# Patient Record
Sex: MALE | Race: WHITE
[De-identification: names, ages, dates, MRNs, and addresses within clinical notes are randomized per-mention and may not be internally consistent; named-entity substitution may affect disease eponyms.]

---

## 2018-10-11 ENCOUNTER — HOSPITAL ENCOUNTER (EMERGENCY)
Dept: HOSPITAL 74 - FER | Age: 83
Discharge: HOME | End: 2018-10-11
Payer: COMMERCIAL

## 2018-10-11 VITALS — TEMPERATURE: 99.7 F | HEART RATE: 82 BPM | SYSTOLIC BLOOD PRESSURE: 132 MMHG | DIASTOLIC BLOOD PRESSURE: 84 MMHG

## 2018-10-11 VITALS — BODY MASS INDEX: 29 KG/M2

## 2018-10-11 DIAGNOSIS — I10: ICD-10-CM

## 2018-10-11 DIAGNOSIS — J02.9: Primary | ICD-10-CM

## 2018-10-11 DIAGNOSIS — E11.9: ICD-10-CM

## 2018-10-11 DIAGNOSIS — R06.02: ICD-10-CM

## 2018-10-11 PROCEDURE — 3E0F7GC INTRODUCTION OF OTHER THERAPEUTIC SUBSTANCE INTO RESPIRATORY TRACT, VIA NATURAL OR ARTIFICIAL OPENING: ICD-10-PCS

## 2018-10-11 NOTE — PDOC
History of Present Illness





- General


History Source: Patient


Exam Limitations: No Limitations





- History of Present Illness


Initial Comments: 





10/11/18 09:06


83 year old man with history of HTN and DM who presents with 2 days of sore 

throat and shortness breath. The patient states two days ago while he was at 

the [facility] he was sitting next to two people who were coughing and since 

then he has felt a sore throat. The patient reports that he previously smoked 

but quit in 1999 and he was not diagnosed with COPD. The patient denies chest 

pain, fever, cough, runny nose, congestion, nausea, vomiting, diaphoresis, 

dysuria, diarrhea or constipation. At bedside the patient has no other 

complaints. 





PMHX: as in HPI


Meds: see below


Allergies: fluorouracil


Tob: quit 1999


Etoh: none


Rec drugs: none





PCP: Zain














<Antoinette Aguiar - Last Filed: 10/11/18 10:11>





<Cuba Garrido - Last Filed: 10/11/18 11:10>





- General


Chief Complaint: Sore Throat


Stated Complaint: COLD SX, SORE THROAT


Time Seen by Provider: 10/11/18 09:06





Attending Attestation





- Resident


Resident Name: Antoinette Aguiar





- ED Attending Attestation


I have performed the following: I have examined & evaluated the patient, The 

case was reviewed & discussed with the resident, I agree w/resident's findings 

& plan, Exceptions are as noted





- HPI


HPI: 


Exposed to coughing people at a public concert


Started uri symptoms , cough


10/11/18 11:01








- Physicial Exam


PE: 


Alert, oriented x 3 minimal wheezing 


10/11/18 11:03








- Medical Decision Making


URI, COPD, negative  X ray,


Improved after therapy here


10/11/18 11:04








<Cuba Garrido S - Last Filed: 10/11/18 11:10>





Past History





- Past Medical History


Anemia: No


Asthma: No


Cancer: No


Cardiac Disorders: No


CVA: No


COPD: No


CHF: No


Dementia: No


Diabetes: No


GI Disorders: Yes (REFLUX)


 Disorders: No


HTN: Yes (DX 2006)


Hypercholesterolemia: Yes (DX 2006)


Liver Disease: No


Seizures: No


Thyroid Disease: Yes (HYPOTHYROIDISM)





- Surgical History


Appendectomy: No


Cardiac Surgery: No


Cholecystectomy: No


Lung Surgery: No


Neurologic Surgery: No


Orthopedic Surgery: No





- Suicide/Smoking/Psychosocial Hx


Smoking History: Former smoker


Have you smoked in the past 12 months: No


If you are a former smoker, when did you quit?: 1982


Hx Alcohol Use: No


Drug/Substance Use Hx: No


Substance Use Type: None


Hx Substance Use Treatment: No





<Antoinette Aguiar - Last Filed: 10/11/18 10:11>





<Cuba Garrido - Last Filed: 10/11/18 11:10>





- Past Medical History


Allergies/Adverse Reactions: 


 Allergies











Allergy/AdvReac Type Severity Reaction Status Date / Time


 


fluorouracil [From [place]] Allergy  Hives Verified 10/11/18 09:07











Home Medications: 


Ambulatory Orders





Amlodipine Besylate [Norvasc -] 2.5 mg PO HS 06/08/16 


Ascorbate Calcium [Vitamin C] 1,000 mg PO DAILY 06/08/16 


Aspirin Coated [Ecotrin -] 81 mg PO DAILY 06/08/16 


Atenolol [Tenormin -] 50 mg PO HS 06/08/16 


Atorvastatin Ca [Lipitor] 10 mg PO DAILY 06/08/16 


Calcium Carbonate/Vitamin D3 [Calcium 600+D Softgel] 1 tab PO DAILY 06/08/16 


Cholecalciferol (Vitamin D3) [Vitamin D3] 1,000 unit PO DAILY 06/08/16 


Glucosamine/Chondr Pitt A Sod [Osteo Bi-Flex Caplet] 1 tab PO DAILY 06/08/16 


Levothyroxine [Synthroid -] 25 mcg PO DAILY 06/08/16 


Multivitamins [Tab-A-Vit -] 1 tab PO DAILY 06/08/16 


[place]-3S/Dha/Epa/Fish Oil [Fish Oil 1,200 mg Softgel] 1 cap PO DAILY 06/08/16 


Albuterol Sulfate Inhaler - [Ventolin HFA Inhaler -] 1 - 2 inh PO Q4H PRN #1 

inhaler 10/11/18 


Atenolol [Tenormin -] 50 mg PO DAILY 10/11/18 


Finasteride 5 mg PO DAILY 10/11/18 











**Review of Systems





- Review of Systems


Able to Perform ROS?: Yes


Is the patient limited English proficient: No


Constitutional: No: Chills, Diaphoresis, Fever


HEENTM: No: Tearing, Tinnitus


Respiratory: Yes: Shortness of Breath.  No: Cough, Orthopnea


Cardiac (ROS): No: Chest Pain


ABD/GI: No: Constipated, Diarrhea, Nausea, Vomiting


: No: Dysuria


Neurological: No: Headache, Numbness, Tingling





<Antoinette Aguiar - Last Filed: 10/11/18 10:11>





*Physical Exam





- Physical Exam


Comments: 





10/11/18 09:45


GENERAL: Awake, alert, and fully oriented, in no acute distress


HEAD: No signs of trauma, normocephalic, atraumatic 


EYES: EOMI, sclera anicteric, conjunctiva clear


ENT: oropharynx clear without exudates. Moist mucosa, slight erythema, no edema


NECK: Normal ROM, supple, no lymphadenopathy


LUNGS: No distress, speaks full sentences, + expiratory wheeze in bilateral 

upper and middle lobes 


HEART: Regular rate and rhythm, normal S1 and S2, no murmurs, rubs or gallops, 

peripheral pulses normal and equal bilaterally. 


ABDOMEN: Soft, nontender, normoactive bowel sounds.  No guarding, no rebound.  

No masses


EXTREMITIES : Normal inspection, Normal range of motion, no edema.  No clubbing 

or cyanosis. 


NEUROLOGICAL: Normal speech, normal gait, no focal sensorimotor deficits 


SKIN: Warm, Dry, normal turgor, no rashes or lesions noted











<Antoinette Aguiar - Last Filed: 10/11/18 10:11>





- Vital Signs


 Last Vital Signs











Temp Pulse Resp BP Pulse Ox


 


 99.7 F H  82   18   132/84   96 


 


 10/11/18 09:04  10/11/18 09:04  10/11/18 09:04  10/11/18 09:04  10/11/18 09:04














<Cuba Garrido S - Last Filed: 10/11/18 11:10>





ED Treatment Course





- Medications


Given in the ED: 


ED Medications














Discontinued Medications














Generic Name Dose Route Start Last Admin





  Trade Name Freq  PRN Reason Stop Dose Admin


 


Albuterol/Ipratropium  1 amp  10/11/18 09:23  10/11/18 09:34





  Duoneb -  NEB  10/11/18 09:24  1 amp





  ONCE ONE   Administration





     





     





     





     














<C[place] Garrido S - Last Filed: 10/11/18 11:10>





Medical Decision Making





- Medical Decision Making





10/11/18 09:48


83 year old man with history of HTN and DM who presents with 2 days of sore 

throat and shortness breath. The patient states two days ago while he was at 

the [facility] he was sitting next to two people who were coughing and since 

then he has felt a sore throat. The patient reports that he previously smoked 

but quit in 1999 and he was not diagnosed with COPD. The patient denies chest 

pain, fever, cough, runny nose, congestion, nausea, vomiting, diaphoresis, 

dysuria, diarrhea or constipation. At bedside the patient has no other 

complaints. 





DDX including but not limited to: viral URI vs COPD vs PNA vs anxiety





W/U: 


- ekg


- cxr





TX:


- duoneb





ED Course: 


Patient stable in in acute distress. Will evaluate for possible causes of 

shortness of breath. Patient's history and symptoms most likely consistent with 

viral URI, however as patient is a previous smoker and had exp wheeze on exam 

and has cardiac risk factors we will evalute with ekg, cxr and treat symptoms 

with duoneb. At bedside patient does not exhibit excess work of breathing. 





10/11/18 10:08


Patient reassessed. Reports that he is feeling improved. Patient with 

resolution of wheeze but with slight coarse breath sounds in the R upper lobe. 


Resting comfortably in no distress. 





10/11/18 10:11


CXR: AP, well penetrated film, cardiac borders well visualized without blunting 

of costophrenic angles, some hilar fullness, no consolidations.  





10/11/18 10:16


Patient stable for discharge. Informed of all lab and imaging results.


Given follow up instructions and strict return precautions.


Patient expressed understanding and agrees to plan.











<Antoinette Aguiar - Last Filed: 10/11/18 10:11>





*DC/Admit/Observation/Transfer





- Discharge Dispostion


Decision to Admit order: No





<Antoinette Aguiar - Last Filed: 10/11/18 10:11>





<Cuba Garrido - Last Filed: 10/11/18 11:10>


Diagnosis at time of Disposition: 


 Sore throat, Shortness of breath








- Discharge Dispostion


Disposition: HOME


Condition at time of disposition: Stable





- Prescriptions


Prescriptions: 


Albuterol Sulfate Inhaler - [Ventolin HFA Inhaler -] 1 - 2 inh PO Q4H PRN #1 

inhaler


 PRN Reason: Short Of Breath/Wheezing





- Referrals


Referrals: 


Amada Carias MD [Primary Care Provider] - 





- Patient Instructions


Printed Discharge Instructions:  DI for Shortness of Breath


Additional Instructions: 


You were seen in the ED for complaints of shortness of breath and sore throat.





In the ED you were evaluated with imaging and ekg. 


Your results were unremarkable.


There does not appear to be a need for immediate hospitalization.





You are advised to follow up with your primary care physician within 1 week.


You were given a prescription for an albuterol inhaler to be used as needed for 

symptoms of shortness of breath or wheezing. 


Please take medications as directed.





Return to the ED immediately if you experience worsening shortness of breath, 

fever, chest pain, palpitations, nausea, vomiting, sweating or any difficulty 

breathing. 








- Post Discharge Activity

## 2018-10-12 NOTE — EKG
Test Reason : 

Blood Pressure : ***/*** mmHG

Vent. Rate : 079 BPM     Atrial Rate : 079 BPM

   P-R Int : 178 ms          QRS Dur : 082 ms

    QT Int : 364 ms       P-R-T Axes : 048 012 012 degrees

   QTc Int : 417 ms

 

NORMAL SINUS RHYTHM

POSSIBLE LEFT ATRIAL ENLARGEMENT

NO PREVIOUS ECGS AVAILABLE

Confirmed by REJI WOOD MD ([id number]) on 10/12/2018 10:22:47 AM

 

Referred By: ANNE JOLLEY           Confirmed By:REJI WOOD MD

## 2024-06-10 ENCOUNTER — APPOINTMENT (OUTPATIENT)
Dept: UROLOGY | Facility: CLINIC | Age: 89
End: 2024-06-10
Payer: MEDICARE

## 2024-06-10 VITALS
DIASTOLIC BLOOD PRESSURE: 79 MMHG | HEIGHT: 65 IN | SYSTOLIC BLOOD PRESSURE: 124 MMHG | OXYGEN SATURATION: 95 % | TEMPERATURE: 97.88 F | BODY MASS INDEX: 29.66 KG/M2 | WEIGHT: 178 LBS | HEART RATE: 70 BPM

## 2024-06-10 DIAGNOSIS — Z82.0 FAMILY HISTORY OF EPILEPSY AND OTHER DISEASES OF THE NERVOUS SYSTEM: ICD-10-CM

## 2024-06-10 DIAGNOSIS — Z80.1 FAMILY HISTORY OF MALIGNANT NEOPLASM OF TRACHEA, BRONCHUS AND LUNG: ICD-10-CM

## 2024-06-10 DIAGNOSIS — Z86.79 PERSONAL HISTORY OF OTHER DISEASES OF THE CIRCULATORY SYSTEM: ICD-10-CM

## 2024-06-10 DIAGNOSIS — Z78.9 OTHER SPECIFIED HEALTH STATUS: ICD-10-CM

## 2024-06-10 DIAGNOSIS — Z87.891 PERSONAL HISTORY OF NICOTINE DEPENDENCE: ICD-10-CM

## 2024-06-10 DIAGNOSIS — N40.0 BENIGN PROSTATIC HYPERPLASIA WITHOUT LOWER URINARY TRACT SYMPMS: ICD-10-CM

## 2024-06-10 PROBLEM — Z00.00 ENCOUNTER FOR PREVENTIVE HEALTH EXAMINATION: Status: ACTIVE | Noted: 2024-06-10

## 2024-06-10 PROCEDURE — G2211 COMPLEX E/M VISIT ADD ON: CPT

## 2024-06-10 PROCEDURE — 99204 OFFICE O/P NEW MOD 45 MIN: CPT

## 2024-06-10 RX ORDER — FINASTERIDE 5 MG/1
5 TABLET, FILM COATED ORAL
Qty: 90 | Refills: 3 | Status: ACTIVE | COMMUNITY
Start: 2024-06-10 | End: 1900-01-01

## 2024-06-10 RX ORDER — FINASTERIDE 1 MG/1
TABLET ORAL
Refills: 0 | Status: ACTIVE | COMMUNITY

## 2024-06-10 RX ORDER — LEVOTHYROXINE SODIUM 0.17 MG/1
TABLET ORAL
Refills: 0 | Status: ACTIVE | COMMUNITY

## 2024-06-10 RX ORDER — ATENOLOL 50 MG/1
TABLET ORAL
Refills: 0 | Status: ACTIVE | COMMUNITY

## 2024-06-10 RX ORDER — LOSARTAN POTASSIUM 100 MG/1
TABLET, FILM COATED ORAL
Refills: 0 | Status: ACTIVE | COMMUNITY

## 2024-06-10 RX ORDER — ATORVASTATIN CALCIUM 80 MG/1
TABLET, FILM COATED ORAL
Refills: 0 | Status: ACTIVE | COMMUNITY

## 2024-06-10 NOTE — HISTORY OF PRESENT ILLNESS
[FreeTextEntry1] : Language: English Accompanied by: Self Contact info: [phone] Referring Provider/PCP:   Initial H&P 06/10/2024: The patient has been a private practice patient of Dr. Bakari Conley Any Prior paper chart is scanned into the [employer] system. Please see admin   Mr. ARELLANO is a very pleasant 89 year-old gentleman who presents today for initial evaluation of establishment of care.  Has been following with Dr. Conley since 1996. Initially presented with BPH symptoms and elevated PSA.  Was not on tamsulosin.  Also had multiple records of "microhematuria" without evidence of formal w/u.  In 2017, had gross hematuria and underwent RBUS and cysto. RBUS showed prostate vol of 71cc (underestimated, obscured by bowel gas) and PVR of 204cc. Had findings of enlarged prostate, trabeculations, no other concerning lesions. He was started on finasteride after the cysto. Also had UTI dx in the past, which was treated with cipro.   Has had multiple elevated PVRs dating back to 2012 (165).   He allegedly had a biopsy in 1996 with records of the [place] from the time, however no path report is available. Allegedly had no findings of cancer.   Today, he states that he has had no changes in his urinary pattern. No new complaints. No recent infections.  --------------------------------------------------------------------------------------------------------------------------------------- PMHx: HTN, HLD, BPH, Hypothyroidism PSHx: SHx: denies x3 FHx: lung cancer, MS   All: NKDA  --------------------------------------------------------------------------------------------------------------------------------------- Physical Exam: General: NAD, sitting on exam table comfortably HEENT: NCAT, EOMI Resp: breathing comfortably on RA, b/l symm chest rise Cardiac: RRR Abd: SNTND Back: (-) CVAT b/l MSK: HOANG núñez/ FROM Psych: appropriate affect  --------------------------------------------------------------------------------------------------------------------------------------- Results: PSA 02/21/2022: 3.9   RBUS 01/26/2017: no hydro, , prostate 71cc (underestimated due to bowel gas) --------------------------------------------------------------------------------------------------------------------------------------- A/P: 89M with BPH, hx of elevated PSA, most recent was 7.8 (adjusted for finasteride), previously was as high as 10.    1. BPH Continue finasteride. Discussed that given he has hx of gross hematuria, it was likely from his prostate (given neg cysto in the past), therefore he should continue finasteride.  Discussed additional PO meds such as alpha blockers, however he does not have bothersome enough symptoms at this time to add a new medication.   2. Elevated PSA Has had negative biopsy in the past.  Based on prior measurements of prostate on ultrasound, PSA appropriate for size (PSAD <0.15).  Ok to stop checking PSAs given age and stability of PSAs.   I have answered all of his questions, and I will see him again on f/u in 1y or sooner PRN.   Plan: - cont finasteride - ok to stop checking PSA  I spent a total of 46 minutes on face-to-face counseling, coordination of care, review of prior records and clinical documentation.

## 2025-05-07 ENCOUNTER — RX RENEWAL (OUTPATIENT)
Age: 89
End: 2025-05-07

## 2025-06-09 ENCOUNTER — APPOINTMENT (OUTPATIENT)
Dept: UROLOGY | Facility: CLINIC | Age: 89
End: 2025-06-09